# Patient Record
Sex: FEMALE | Race: WHITE
[De-identification: names, ages, dates, MRNs, and addresses within clinical notes are randomized per-mention and may not be internally consistent; named-entity substitution may affect disease eponyms.]

---

## 2017-09-16 ENCOUNTER — HOSPITAL ENCOUNTER (EMERGENCY)
Dept: HOSPITAL 31 - C.ER | Age: 52
Discharge: LEFT BEFORE DISCHARGE | End: 2017-09-16
Payer: COMMERCIAL

## 2017-09-16 VITALS — DIASTOLIC BLOOD PRESSURE: 80 MMHG | HEART RATE: 48 BPM | SYSTOLIC BLOOD PRESSURE: 131 MMHG

## 2017-09-16 VITALS — TEMPERATURE: 97.9 F

## 2017-09-16 VITALS — RESPIRATION RATE: 16 BRPM

## 2017-09-16 VITALS — OXYGEN SATURATION: 100 %

## 2017-09-16 DIAGNOSIS — R00.1: Primary | ICD-10-CM

## 2017-09-16 DIAGNOSIS — R07.9: ICD-10-CM

## 2017-09-16 LAB
ALBUMIN/GLOB SERPL: 1.2 {RATIO} (ref 1–2.1)
ALP SERPL-CCNC: 76 U/L (ref 38–126)
ALT SERPL-CCNC: 27 U/L (ref 9–52)
AST SERPL-CCNC: 24 U/L (ref 14–36)
BASOPHILS # BLD AUTO: 0 K/UL (ref 0–0.2)
BASOPHILS NFR BLD: 0.5 % (ref 0–2)
BILIRUB SERPL-MCNC: 0.5 MG/DL (ref 0.2–1.3)
BUN SERPL-MCNC: 8 MG/DL (ref 7–17)
CALCIUM SERPL-MCNC: 8.6 MG/DL (ref 8.6–10.4)
CHLORIDE SERPL-SCNC: 107 MMOL/L (ref 98–107)
CO2 SERPL-SCNC: 26 MMOL/L (ref 22–30)
EOSINOPHIL # BLD AUTO: 0.2 K/UL (ref 0–0.7)
EOSINOPHIL NFR BLD: 2.1 % (ref 0–4)
ERYTHROCYTE [DISTWIDTH] IN BLOOD BY AUTOMATED COUNT: 14.6 % (ref 11.5–14.5)
GLOBULIN SER-MCNC: 3.2 GM/DL (ref 2.2–3.9)
GLUCOSE SERPL-MCNC: 79 MG/DL (ref 65–105)
HCT VFR BLD CALC: 36.9 % (ref 34–47)
LYMPHOCYTES # BLD AUTO: 3.1 K/UL (ref 1–4.3)
LYMPHOCYTES NFR BLD AUTO: 40.2 % (ref 20–40)
MCH RBC QN AUTO: 26 PG (ref 27–31)
MCHC RBC AUTO-ENTMCNC: 32.7 G/DL (ref 33–37)
MCV RBC AUTO: 79.5 FL (ref 81–99)
MONOCYTES # BLD: 0.4 K/UL (ref 0–0.8)
MONOCYTES NFR BLD: 5 % (ref 0–10)
NRBC BLD AUTO-RTO: 0.1 % (ref 0–2)
PLATELET # BLD: 208 K/UL (ref 130–400)
PMV BLD AUTO: 9.4 FL (ref 7.2–11.7)
POTASSIUM SERPL-SCNC: 3.4 MMOL/L (ref 3.6–5.2)
PROT SERPL-MCNC: 7.1 G/DL (ref 6.3–8.3)
SODIUM SERPL-SCNC: 143 MMOL/L (ref 132–148)
WBC # BLD AUTO: 7.6 K/UL (ref 4.8–10.8)

## 2017-09-16 PROCEDURE — 83880 ASSAY OF NATRIURETIC PEPTIDE: CPT

## 2017-09-16 PROCEDURE — 99285 EMERGENCY DEPT VISIT HI MDM: CPT

## 2017-09-16 PROCEDURE — 96361 HYDRATE IV INFUSION ADD-ON: CPT

## 2017-09-16 PROCEDURE — 84484 ASSAY OF TROPONIN QUANT: CPT

## 2017-09-16 PROCEDURE — 85025 COMPLETE CBC W/AUTO DIFF WBC: CPT

## 2017-09-16 PROCEDURE — 96360 HYDRATION IV INFUSION INIT: CPT

## 2017-09-16 PROCEDURE — 80053 COMPREHEN METABOLIC PANEL: CPT

## 2017-09-16 NOTE — C.PDOC
History Of Present Illness


A 52 year old female, who denies any significant past medical history, presents 

to the emergency department for a severe intermittent headache with light 

sensitivity, which began 1 week ago, but has been worsened within the past 3 

days. The patient reports the pain cuts her vision for a second, but majority 

she is more sensitive to the light. She reports that she has had similar 

symptoms in the past and took Motrin to alleviate her symptoms, but lately the 

Motrin isn't suffice. The patient reports associated and she denies any vomiting

, fever, chest pain, shortness of breath, abdominal pain, or any other 

complaints at this time.  


Time Seen by Provider: 09/16/17 07:25


Chief Complaint (Nursing): Headache


History Per: Patient


Onset/Duration Of Symptoms: Days (x 1 week), Intermittent Episodes


Current Symptoms Are (Timing): Still Present


Severity: Mild


Pain Scale Rating Of: 10


Associated Symptoms: Photophobia, Nausea.  denies: Vomiting, Extremity Weakness





Past Medical History


Reviewed: Historical Data, Nursing Documentation, Vital Signs


Vital Signs: 


 Last Vital Signs











Temp  97.9 F   09/16/17 06:52


 


Pulse  47 L  09/16/17 11:26


 


Resp  16   09/16/17 11:26


 


BP  153/86 H  09/16/17 11:26


 


Pulse Ox  100   09/16/17 11:26











Family History: States: Unknown Family Hx





- Social History


Hx Tobacco Use: No


Hx Alcohol Use: No


Hx Substance Use: No





- Immunization History


Hx Tetanus Toxoid Vaccination: No


Hx Influenza Vaccination: No


Hx Pneumococcal Vaccination: No





Review Of Systems


Except As Marked, All Systems Reviewed And Found Negative.


Constitutional: Negative for: Fever


Cardiovascular: Negative for: Chest Pain


Respiratory: Negative for: Shortness of Breath


Gastrointestinal: Positive for: Nausea.  Negative for: Vomiting, Abdominal Pain


Neurological: Positive for: Headache





Physical Exam





- Physical Exam


Appears: Well, Non-toxic, No Acute Distress


Skin: Normal Color, Warm, Dry


Head: Atraumatic, Normacephalic


Eye(s): bilateral: Normal Inspection, PERRL, EOMI


Nose: Normal


Throat: Normal


Neck: Normal


Cardiovascular: Rhythm Regular


Respiratory: Normal Breath Sounds


Gastrointestinal/Abdominal: Normal Exam


Back: Normal Inspection


Extremity: Normal ROM





ED Course And Treatment





- Laboratory Results


Result Diagrams: 


 09/16/17 10:54





 09/16/17 10:54


O2 Sat by Pulse Oximetry: 99





Medical Decision Making


Medical Decision Making: 


Treatment Plan:














Progress Notes: Patient's head ache has resolved. Was being observed in the ED 

for bradycardia. Pulse in mid 40's. 


Now patient c/o chest pain. Mid sternal, intermittent 4/10. No SOB NV. 





Disposition


Discussed With Dr.: Mathieu Bragg


Counseled Patient/Family Regarding: Studies Performed





- Disposition


Disposition: HOME/ ROUTINE


Disposition Time: 11:51


Condition: GUARDED


Forms:  [facility] (English)





- Clinical Impression


Clinical Impression: 


 Bradycardia, Chest pain








- Scribe Statement


The provider has reviewed the documentation as recorded by the Scribe


Tracie Irving 





All medical record entries made by the Scribe were at my direction and 

personally dictated by me. I have reviewed the chart and agree that the record 

accurately reflects my personal performance of the history, physical exam, 

medical decision making, and the department course for this patient. I have 

also personally directed, reviewed, and agree with the discharge instructions 

and disposition.





Decision To Admit





- Pt Status Changed To:


Hospital Disposition Of: Observation





- .


Bed Request Type: Telemetry


Patient Diagnosis: 


 Bradycardia, Chest pain

## 2017-09-19 NOTE — CARD
--------------- APPROVED REPORT --------------





EKG Measurement

Heart [id number]

OR [id number]

[id number]

[id number]

[id number]



<Conclusion>

Marked sinus bradycardia

Abnormal ECG

## 2018-02-09 ENCOUNTER — HOSPITAL ENCOUNTER (EMERGENCY)
Dept: HOSPITAL 31 - C.ER | Age: 53
Discharge: HOME | End: 2018-02-09
Payer: COMMERCIAL

## 2018-02-09 VITALS
SYSTOLIC BLOOD PRESSURE: 121 MMHG | OXYGEN SATURATION: 99 % | HEART RATE: 76 BPM | DIASTOLIC BLOOD PRESSURE: 82 MMHG | RESPIRATION RATE: 16 BRPM

## 2018-02-09 VITALS — TEMPERATURE: 97.9 F

## 2018-02-09 VITALS — BODY MASS INDEX: 27.8 KG/M2

## 2018-02-09 DIAGNOSIS — M25.521: Primary | ICD-10-CM

## 2018-02-09 NOTE — RAD
PROCEDURE:  Radiographs of the right elbow.



HISTORY:

r/o fx  



COMPARISON:

No prior.



FINDINGS:



BONES:

Normal. No fracture.



JOINTS:

Normal. No osteoarthritis.



SOFT TISSUES:

Normal.



JOINT EFFUSION:

None.



OTHER FINDINGS:

None.



IMPRESSION:

Unremarkable radiographs of the right elbow.

## 2018-02-09 NOTE — C.PDOC
History Of Present Illness


52 yr old female presents to the ER with right elbow pain for the past 5 days. 

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 Denies direct trauma, fall, 

shoulder pain, arm pain, back pain, weakness or numbness.


Time Seen by Provider: 02/09/18 10:10


Chief Complaint (Nursing): Upper Extremity Problem/Injury


History Per: Patient


History/Exam Limitations: no limitations


Onset/Duration Of Symptoms: Days (5)


Current Symptoms Are (Timing): Still Present





Past Medical History


Reviewed: Historical Data, Nursing Documentation, Vital Signs


Vital Signs: 


 Last Vital Signs











Temp  97.9 F   02/09/18 09:41


 


Pulse  76   02/09/18 11:08


 


Resp  16   02/09/18 11:08


 


BP  121/82   02/09/18 11:08


 


Pulse Ox  99   02/09/18 13:08











Family History: States: No Known Family Hx





- Social History


Hx Tobacco Use: No


Hx Alcohol Use: No


Hx Substance Use: No





- Immunization History


Hx Tetanus Toxoid Vaccination: No


Hx Influenza Vaccination: No


Hx Pneumococcal Vaccination: No





Review Of Systems


Except As Marked, All Systems Reviewed And Found Negative.


Musculoskeletal: Positive for: Other ((+) right elbow pain).  Negative for: 

Shoulder Pain, Arm Pain, Back Pain


Neurological: Negative for: Weakness, Numbness





Physical Exam





- Physical Exam


Appears: Non-toxic, No Acute Distress


Skin: Warm, Dry, No Rash


Oral Mucosa: Moist


Respiratory: Normal Breath Sounds


Extremity: Normal ROM (right arm), Tenderness (tenderness to the posterior 

aspect of right elbow), Capillary Refill (<2 secs), No Swelling


Neurological/Psych: Oriented x3, Normal Speech





ED Course And Treatment


O2 Sat by Pulse Oximetry: 99 (RA)


Pulse Ox Interpretation: Normal





- Other Rad


  ** X-Ray - Right Elbow


X-Ray: Viewed By Me, Read By Radiologist


Interpretation: IMPRESSION:  Unremarkable radiographs of the right elbow.





Medical Decision Making


Medical Decision Making: 


PLAN:


* X-Ray - Right Elbow





Disposition





- Disposition


Referrals: 


[facility] [Outside]


[facility] [Outside]


Disposition: HOME/ ROUTINE


Disposition Time: 10:15


Condition: GOOD


Additional Instructions: 





Thank you for letting us take care of you today. The emergency medical care you 

received today was directed at your acute symptoms. If you were prescribed any 

medication, please fill it and take as directed. It may take several days for 

your symptoms to resolve. Return to the Emergency Department if your symptoms 

worsen, do not improve, or if you have any other problems.





Please contact your doctor or call one of the physicians/clinics you have been 

referred to that are listed on the Patient Visit Information form that is 

included in your discharge packet. Bring any paperwork you were given at 

discharge with you along with any medications you are taking to your follow up 

visit. Our treatment cannot replace ongoing medical care by a primary care 

provider (PCP) outside of the emergency department.





Thank you for allowing the [facility] team to be part of your care today.

















Follow up with the clinic in 3-4 days for re-evaluation and further management.








Alexsandra por dejarnos atenderlo hoy. La atencin mdica de emergencia que recibi

 hoy estaba dirigida a emelia sntomas agudos. Si le prescribieron algn 

medicamento, llnelo y tome segn las indicaciones. Emelia sntomas pueden tardar 

varios rowland en resolverse. Regrese al Departamento de Emergencia si emelia s

ntomas empeoran, no mejoran o si tiene algn otro problema.





Comunquese con palma mdico o llame a nakul de los mdicos / clnicas a los que ha 

sido referido que figura en el formulario de Informacin de visita del paciente 

que se incluye en palma paquete de barrett. Traiga todos los documentos que recibi 

al momento del barrett junto con los medicamentos que est tomando en palma visita de 

seguimiento. Nuestro tratamiento no puede reemplazar la atencin mdica en 

curso por parte de un proveedor de atencin primaria (PCP) fuera del 

departamento de emergencias.





Alexsandra por permitir que el equipo de [facility] sea parte de palma cuidado 

hoy.

















Richar un seguimiento con la clnica en 3-4 rowland para cecile nueva evaluacin y ms 

administracin.


Prescriptions: 


Ibuprofen [Motrin] 600 mg PO Q6 PRN #20 tab


 PRN Reason: Pain, Moderate (4-7)


Instructions:  Elbow Sprain (ED)


Forms:  Gen Discharge Inst Swedish


Print Language: Djib[place]ian





- Clinical Impression


Clinical Impression: 


 Elbow pain








- Scribe Statement


The provider has reviewed the documentation as recorded by the Shayyibe


Dionne Cota


Provider Attestation: 


All medical record entries made by the Shayyibe were at my direction and 

personally dictated by me. I have reviewed the chart and agree that the record 

accurately reflects my personal performance of the history, physical exam, 

medical decision making, and the department course for this patient. I have 

also personally directed, reviewed, and agree with the discharge instructions 

and disposition.

## 2018-12-28 ENCOUNTER — HOSPITAL ENCOUNTER (EMERGENCY)
Dept: HOSPITAL 31 - C.ER | Age: 53
Discharge: HOME | End: 2018-12-28
Payer: COMMERCIAL

## 2018-12-28 VITALS — BODY MASS INDEX: 27.8 KG/M2

## 2018-12-28 VITALS
TEMPERATURE: 99 F | DIASTOLIC BLOOD PRESSURE: 68 MMHG | RESPIRATION RATE: 16 BRPM | SYSTOLIC BLOOD PRESSURE: 113 MMHG | HEART RATE: 77 BPM

## 2018-12-28 VITALS — OXYGEN SATURATION: 97 %

## 2018-12-28 DIAGNOSIS — J11.1: Primary | ICD-10-CM

## 2018-12-28 LAB
BASE EXCESS BLDV CALC-SCNC: 0.6 MMOL/L (ref 0–2)
BASOPHILS # BLD AUTO: 0 K/UL (ref 0–0.2)
BASOPHILS NFR BLD: 0.3 % (ref 0–2)
BUN SERPL-MCNC: 9 MG/DL (ref 7–17)
CALCIUM SERPL-MCNC: 8.9 MG/DL (ref 8.6–10.4)
EOSINOPHIL # BLD AUTO: 0 K/UL (ref 0–0.7)
EOSINOPHIL NFR BLD: 0 % (ref 0–4)
ERYTHROCYTE [DISTWIDTH] IN BLOOD BY AUTOMATED COUNT: 14.3 % (ref 11.5–14.5)
GFR NON-AFRICAN AMERICAN: > 60
HGB BLD-MCNC: 12.7 G/DL (ref 11–16)
LYMPHOCYTE: 9 % (ref 20–40)
LYMPHOCYTES # BLD AUTO: 0.9 K/UL (ref 1–4.3)
LYMPHOCYTES NFR BLD AUTO: 7.7 % (ref 20–40)
MCH RBC QN AUTO: 25.4 PG (ref 27–31)
MCHC RBC AUTO-ENTMCNC: 31.6 G/DL (ref 33–37)
MCV RBC AUTO: 80.4 FL (ref 81–99)
MONOCYTE: 3 % (ref 0–10)
MONOCYTES # BLD: 0.4 K/UL (ref 0–0.8)
MONOCYTES NFR BLD: 3.6 % (ref 0–10)
NEUTROPHILS # BLD: 10.5 K/UL (ref 1.8–7)
NEUTROPHILS NFR BLD AUTO: 82 % (ref 50–75)
NEUTROPHILS NFR BLD AUTO: 88.4 % (ref 50–75)
NEUTS BAND NFR BLD: 6 % (ref 0–2)
NRBC BLD AUTO-RTO: 0 % (ref 0–2)
PCO2 BLDV: 47 MMHG (ref 40–60)
PH BLDV: 7.36 [PH] (ref 7.32–7.43)
PLATELET # BLD EST: NORMAL 10*3/UL
PLATELET # BLD: 199 K/UL (ref 130–400)
PMV BLD AUTO: 8 FL (ref 7.2–11.7)
RBC # BLD AUTO: 5.01 MIL/UL (ref 3.8–5.2)
RBC MORPH BLD: NORMAL
TOTAL CELLS COUNTED BLD: 100
VENOUS BLOOD GAS PO2: 32 MM/HG (ref 30–55)
WBC # BLD AUTO: 11.9 K/UL (ref 4.8–10.8)

## 2018-12-28 PROCEDURE — 80048 BASIC METABOLIC PNL TOTAL CA: CPT

## 2018-12-28 PROCEDURE — 99285 EMERGENCY DEPT VISIT HI MDM: CPT

## 2018-12-28 PROCEDURE — 87804 INFLUENZA ASSAY W/OPTIC: CPT

## 2018-12-28 PROCEDURE — 82803 BLOOD GASES ANY COMBINATION: CPT

## 2018-12-28 PROCEDURE — 96361 HYDRATE IV INFUSION ADD-ON: CPT

## 2018-12-28 PROCEDURE — 96375 TX/PRO/DX INJ NEW DRUG ADDON: CPT

## 2018-12-28 PROCEDURE — 87040 BLOOD CULTURE FOR BACTERIA: CPT

## 2018-12-28 PROCEDURE — 85025 COMPLETE CBC W/AUTO DIFF WBC: CPT

## 2018-12-28 PROCEDURE — 96374 THER/PROPH/DIAG INJ IV PUSH: CPT

## 2018-12-28 NOTE — C.PDOC
History Of Present Illness





52 y/o female presents to ED stating that for the past 2 days, shes had fever, 

nausea, vomiting, and epigastric pain. Patient had vomited once this morning but

no diarrhea. Also complains of a headache. Otherwise she has no other physical 

complaints.





Time Seen by Provider: 12/28/18 13:27


Chief Complaint (Nursing): Fever


History Per: Patient


History/Exam Limitations: no limitations


Onset/Duration Of Symptoms: Days


Current Symptoms Are (Timing): Still Present





Past Medical History


Reviewed: Historical Data, Nursing Documentation, Vital Signs


Vital Signs: 





                                Last Vital Signs











Temp  101.4 F H  12/28/18 12:55


 


Pulse  122 H  12/28/18 12:55


 


Resp  18   12/28/18 12:55


 


BP  120/81   12/28/18 12:55


 


Pulse Ox  97   12/28/18 12:55











Family History: States: No Known Family Hx





- Social History


Hx Tobacco Use: No


Hx Alcohol Use: No


Hx Substance Use: No





- Immunization History


Hx Tetanus Toxoid Vaccination: No


Hx Influenza Vaccination: No


Hx Pneumococcal Vaccination: No





Review Of Systems


Constitutional: Positive for: Fever


Cardiovascular: Negative for: Chest Pain


Respiratory: Negative for: Shortness of Breath


Gastrointestinal: Positive for: Nausea, Vomiting, Abdominal Pain (epigastric).  

Negative for: Diarrhea


Skin: Negative for: Rash


Neurological: Positive for: Headache





Physical Exam





- Physical Exam


Appears: Non-toxic, No Acute Distress


Skin: Warm, Dry


Head: Atraumatic, Normacephalic


Eye(s): bilateral: Normal Inspection


Oral Mucosa: Moist


Neck: Supple


Chest: Symmetrical


Cardiovascular: Rhythm Regular (tachycardic), No Murmur


Respiratory: Normal Breath Sounds, No Rales, No Rhonchi, No Wheezing


Gastrointestinal/Abdominal: Tenderness (minimal diffuse abdominal tenderness, 

worse at epigastric region)


Extremity: Bilateral: Atraumatic, Normal Color And Temperature, Normal ROM


Neurological/Psych: Oriented x3, Normal Speech





ED Course And Treatment





- Laboratory Results


Result Diagrams: 


                                 12/28/18 14:09





                                 12/28/18 14:09


O2 Sat by Pulse Oximetry: 97 (RA)


Pulse Ox Interpretation: Normal





Medical Decision Making


Medical Decision Making: 





Plan:


--VBG


--Bloodwork


--Flu swab


--IV fluids


--Toradol 30 mg IV


--Tylenol 650 mg PO


--Zofran 4 mg IV








Flu swab negative. All results discussed with patient. On re-eval patient 

reports improvement, temp 99 and HR 88. Stable for discharge home at this time. 

Advised supportive care for viral illness, and returning to the ED for any new 

or worsening symptoms.





Disposition





- Disposition


Disposition: HOME/ ROUTINE


Disposition Time: 16:58


Condition: STABLE


Additional Instructions: 





KRIS FOREMAN, thank you for letting us take care of you today. Your 

provider was Marycruz Javier MD and you were treated for STOMACH 

PAIN/HEADACHE. The emergency medical care you received today was directed at yo

ur acute symptoms. If you were prescribed any medication, please fill it and 

take as directed. It may take several days for your symptoms to resolve. Return 

to the Emergency Department if your symptoms worsen, do not improve, or if you 

have any other problems.





Please contact your doctor or call one of the physicians/clinics you have been 

referred to that are listed on the Patient Visit Information form that is 

included in your discharge packet. Bring any paperwork you were given at 

discharge with you along with any medications you are taking to your follow up 

visit. Our treatment cannot replace ongoing medical care by a primary care 

provider outside of the emergency department.





Thank you for allowing the [facility] team to be part of your care today.








If you had an X-Ray or CT scan: A Radiologist will review the ED reading if any 

change in treatment is needed we will contact you.***





If you had a blood, urine, or wound culture: It will take several days for the 

results, if any change in treatment is needed we will contact you.***





If you had an STI test: It will take 48 hours for the results. Please call after

1 week if you have not heard back.***


Instructions:  Viral Syndrome (DC)


Forms:  Gen Discharge Inst Cuban, [facility] (Cuban)





- Clinical Impression


Clinical Impression: 


 Influenza-like illness








- Scribe Statement


The provider has reviewed the documentation as recorded by the Shayyibcarolee Melendez





Provider Attestation: 





All medical record entries made by the Scribe were at my direction and 

personally dictated by me. I have reviewed the chart and agree that the record 

accurately reflects my personal performance of the history, physical exam, 

medical decision making, and the department course for this patient. I have also

personally directed, reviewed, and agree with the discharge instructions and 

disposition.